# Patient Record
Sex: MALE | Race: WHITE | NOT HISPANIC OR LATINO | Employment: OTHER | URBAN - NONMETROPOLITAN AREA
[De-identification: names, ages, dates, MRNs, and addresses within clinical notes are randomized per-mention and may not be internally consistent; named-entity substitution may affect disease eponyms.]

---

## 2021-11-23 ENCOUNTER — OFFICE VISIT (OUTPATIENT)
Dept: FAMILY MEDICINE | Facility: CLINIC | Age: 76
End: 2021-11-23
Payer: OTHER GOVERNMENT

## 2021-11-23 VITALS
RESPIRATION RATE: 18 BRPM | SYSTOLIC BLOOD PRESSURE: 152 MMHG | HEIGHT: 68 IN | TEMPERATURE: 98 F | OXYGEN SATURATION: 98 % | DIASTOLIC BLOOD PRESSURE: 78 MMHG | BODY MASS INDEX: 28.79 KG/M2 | HEART RATE: 62 BPM | WEIGHT: 190 LBS

## 2021-11-23 DIAGNOSIS — H60.501 ACUTE OTITIS EXTERNA OF RIGHT EAR, UNSPECIFIED TYPE: Primary | ICD-10-CM

## 2021-11-23 PROCEDURE — 99203 OFFICE O/P NEW LOW 30 MIN: CPT | Mod: 25,,, | Performed by: NURSE PRACTITIONER

## 2021-11-23 PROCEDURE — 99203 PR OFFICE/OUTPT VISIT, NEW, LEVL III, 30-44 MIN: ICD-10-PCS | Mod: 25,,, | Performed by: NURSE PRACTITIONER

## 2021-11-23 PROCEDURE — 96372 PR INJECTION,THERAP/PROPH/DIAG2ST, IM OR SUBCUT: ICD-10-PCS | Mod: ,,, | Performed by: NURSE PRACTITIONER

## 2021-11-23 PROCEDURE — 96372 THER/PROPH/DIAG INJ SC/IM: CPT | Mod: ,,, | Performed by: NURSE PRACTITIONER

## 2021-11-23 RX ORDER — AMLODIPINE BESYLATE 10 MG/1
10 TABLET ORAL DAILY
COMMUNITY

## 2021-11-23 RX ORDER — ASPIRIN 81 MG/1
81 TABLET ORAL DAILY
COMMUNITY

## 2021-11-23 RX ORDER — LISINOPRIL 10 MG/1
20 TABLET ORAL DAILY
COMMUNITY

## 2021-11-23 RX ORDER — CEFTRIAXONE 1 G/1
1 INJECTION, POWDER, FOR SOLUTION INTRAMUSCULAR; INTRAVENOUS
Status: COMPLETED | OUTPATIENT
Start: 2021-11-23 | End: 2021-11-23

## 2021-11-23 RX ORDER — AMOXICILLIN 500 MG/1
500 CAPSULE ORAL 3 TIMES DAILY
Qty: 30 CAPSULE | Refills: 0 | Status: SHIPPED | OUTPATIENT
Start: 2021-11-23 | End: 2023-03-10

## 2021-11-23 RX ADMIN — CEFTRIAXONE 1 G: 1 INJECTION, POWDER, FOR SOLUTION INTRAMUSCULAR; INTRAVENOUS at 10:11

## 2021-12-17 ENCOUNTER — IMMUNIZATION (OUTPATIENT)
Dept: FAMILY MEDICINE | Facility: CLINIC | Age: 76
End: 2021-12-17
Payer: MEDICARE

## 2021-12-17 DIAGNOSIS — Z23 NEED FOR VACCINATION: Primary | ICD-10-CM

## 2021-12-17 PROCEDURE — 0064A COVID-19, MRNA, LNP-S, PF, 100 MCG/0.25 ML DOSE VACCINE (MODERNA BOOSTER): ICD-10-PCS | Mod: ,,, | Performed by: NURSE PRACTITIONER

## 2021-12-17 PROCEDURE — 91306 COVID-19, MRNA, LNP-S, PF, 100 MCG/0.25 ML DOSE VACCINE (MODERNA BOOSTER): ICD-10-PCS | Mod: ,,, | Performed by: NURSE PRACTITIONER

## 2021-12-17 PROCEDURE — 0064A COVID-19, MRNA, LNP-S, PF, 100 MCG/0.25 ML DOSE VACCINE (MODERNA BOOSTER): CPT | Mod: ,,, | Performed by: NURSE PRACTITIONER

## 2021-12-17 PROCEDURE — 91306 COVID-19, MRNA, LNP-S, PF, 100 MCG/0.25 ML DOSE VACCINE (MODERNA BOOSTER): CPT | Mod: ,,, | Performed by: NURSE PRACTITIONER

## 2023-03-10 ENCOUNTER — APPOINTMENT (OUTPATIENT)
Dept: RADIOLOGY | Facility: CLINIC | Age: 78
End: 2023-03-10
Attending: NURSE PRACTITIONER
Payer: MEDICARE

## 2023-03-10 ENCOUNTER — OFFICE VISIT (OUTPATIENT)
Dept: FAMILY MEDICINE | Facility: CLINIC | Age: 78
End: 2023-03-10
Payer: MEDICARE

## 2023-03-10 VITALS
BODY MASS INDEX: 27.49 KG/M2 | TEMPERATURE: 98 F | HEART RATE: 65 BPM | SYSTOLIC BLOOD PRESSURE: 160 MMHG | OXYGEN SATURATION: 98 % | WEIGHT: 181.38 LBS | HEIGHT: 68 IN | DIASTOLIC BLOOD PRESSURE: 68 MMHG | RESPIRATION RATE: 18 BRPM

## 2023-03-10 DIAGNOSIS — S60.415A: ICD-10-CM

## 2023-03-10 DIAGNOSIS — L08.9: Primary | ICD-10-CM

## 2023-03-10 DIAGNOSIS — S60.414A: Primary | ICD-10-CM

## 2023-03-10 DIAGNOSIS — L08.9: ICD-10-CM

## 2023-03-10 PROCEDURE — 87077 CULTURE AEROBIC IDENTIFY: CPT | Mod: ,,, | Performed by: CLINICAL MEDICAL LABORATORY

## 2023-03-10 PROCEDURE — 73140 XR FINGER 2 OR MORE VIEWS LEFT: ICD-10-PCS | Mod: 26,LT,, | Performed by: RADIOLOGY

## 2023-03-10 PROCEDURE — 73140 X-RAY EXAM OF FINGER(S): CPT | Mod: TC,RHCUB,LT | Performed by: NURSE PRACTITIONER

## 2023-03-10 PROCEDURE — 99213 PR OFFICE/OUTPT VISIT, EST, LEVL III, 20-29 MIN: ICD-10-PCS | Mod: ,,, | Performed by: NURSE PRACTITIONER

## 2023-03-10 PROCEDURE — 99213 OFFICE O/P EST LOW 20 MIN: CPT | Mod: ,,, | Performed by: NURSE PRACTITIONER

## 2023-03-10 PROCEDURE — 87070 CULTURE, WOUND: ICD-10-PCS | Mod: ,,, | Performed by: CLINICAL MEDICAL LABORATORY

## 2023-03-10 PROCEDURE — 87186 CULTURE, WOUND: ICD-10-PCS | Mod: ,,, | Performed by: CLINICAL MEDICAL LABORATORY

## 2023-03-10 PROCEDURE — 87186 SC STD MICRODIL/AGAR DIL: CPT | Mod: ,,, | Performed by: CLINICAL MEDICAL LABORATORY

## 2023-03-10 PROCEDURE — 87077 CULTURE, WOUND: ICD-10-PCS | Mod: ,,, | Performed by: CLINICAL MEDICAL LABORATORY

## 2023-03-10 PROCEDURE — 73140 X-RAY EXAM OF FINGER(S): CPT | Mod: 26,LT,, | Performed by: RADIOLOGY

## 2023-03-10 PROCEDURE — 87070 CULTURE OTHR SPECIMN AEROBIC: CPT | Mod: ,,, | Performed by: CLINICAL MEDICAL LABORATORY

## 2023-03-10 RX ORDER — ALLOPURINOL 100 MG/1
100 TABLET ORAL DAILY
COMMUNITY
Start: 2023-02-15

## 2023-03-10 RX ORDER — AMOXICILLIN AND CLAVULANATE POTASSIUM 875; 125 MG/1; MG/1
1 TABLET, FILM COATED ORAL EVERY 12 HOURS
Qty: 20 TABLET | Refills: 0 | Status: SHIPPED | OUTPATIENT
Start: 2023-03-10 | End: 2023-03-13

## 2023-03-10 RX ORDER — SILVER SULFADIAZINE 10 G/1000G
CREAM TOPICAL 2 TIMES DAILY
Qty: 85 G | Refills: 0 | Status: SHIPPED | OUTPATIENT
Start: 2023-03-10 | End: 2023-03-20

## 2023-03-10 NOTE — PROGRESS NOTES
ANNA Brurell   [facility]  [address]  [place] [address]     PATIENT NAME: Maicol Rod  : 1945  DATE: 3/10/23  MRN: [id number]      Billing Provider: ANNA Burrell  Level of Service:   Patient PCP Information       Provider PCP Type    ANNA Burrell General            Reason for Visit / Chief Complaint: Insect Bite (Patient believes he got bit by a spider or possible burn to ring finger on right hand, thinks it happened Monday but it has gotten really red and swollen)       Update PCP  Update Chief Complaint         History of Present Illness / Problem Focused Workflow     Maicol Rod presents to the clinic Monday thinks something may have stung him he also thinks he may have burned it while burning trashto his right hand ring finger, having a blister increased swelling redness and draining. He has soaked in epsom salt cleaned with betadine. He has not had any fever continued to do his normal activities.     No results found for: HGBA1C     CMP  No results found for: NA, K, CL, CO2, GLU, BUN, CREATININE, CALCIUM, PROT, ALBUMIN, BILITOT, ALKPHOS, AST, ALT, ANIONGAP, EGFRNORACEVR     No results found for: WBC, RBC, HGB, HCT, MCV, MCH, MCHC, RDW, PLT, MPV, GRAN, LYMPH, MONO, EOS, BASO, EOSINOPHIL, BASOPHIL     No results found for: CHOL  No results found for: HDL  No results found for: LDLCALC  No results found for: TRIG  No results found for: CHOLHDL     Wt Readings from Last 3 Encounters:   03/10/23 0915 82.3 kg (181 lb 6.4 oz)   21 0945 86.2 kg (190 lb)        BP Readings from Last 3 Encounters:   03/10/23 (!) 160/68   21 (!) 152/78        Review of Systems     Review of Systems   Constitutional:  Negative for chills and fever.   HENT:  Negative for trouble swallowing.    Respiratory:  Negative for cough and shortness of breath.    Cardiovascular:  Negative for chest pain and palpitations.   Gastrointestinal:  Negative for  "diarrhea and vomiting.   Musculoskeletal:  Positive for joint swelling.   Integumentary:  Positive for wound.   Neurological:  Negative for weakness, numbness and headaches.      Medical / Social / Family History     Past Medical History:   Diagnosis Date    Hypertension        Past Surgical History:   Procedure Laterality Date    APPENDECTOMY      CHOLECYSTECTOMY         Social History    reports that he has never smoked. He has never been exposed to tobacco smoke. He has never used smokeless tobacco. He reports current alcohol use. He reports that he does not use drugs.    Family History  's family history is not on file.    Medications and Allergies     Medications  Outpatient Medications Marked as Taking for the 3/10/23 encounter (Office Visit) with ANNA Burrell   Medication Sig Dispense Refill    allopurinoL (ZYLOPRIM) 100 MG tablet Take 100 mg by mouth once daily.      amLODIPine (NORVASC) 10 MG tablet Take 10 mg by mouth once daily.      aspirin (ECOTRIN) 81 MG EC tablet Take 81 mg by mouth once daily.      lisinopriL 10 MG tablet Take 10 mg by mouth once daily.         Allergies  Review of patient's allergies indicates:  No Known Allergies    Physical Examination     Vitals:    03/10/23 0915 03/10/23 0941 03/10/23 1022   BP: (!) 180/70 (!) 182/68 (!) 160/68   BP Location: Left arm Right arm Left arm   Patient Position: Sitting Sitting Sitting   Pulse: 65     Resp: 18     Temp: 98.2 °F (36.8 °C)     TempSrc: Oral     SpO2: 98%     Weight: 82.3 kg (181 lb 6.4 oz)     Height: 5' 8" (1.727 m)        Physical Exam  Vitals and nursing note reviewed.   Constitutional:       General: He is not in acute distress.     Appearance: Normal appearance. He is not ill-appearing, toxic-appearing or diaphoretic.   Cardiovascular:      Rate and Rhythm: Normal rate and regular rhythm.   Pulmonary:      Effort: Pulmonary effort is normal.      Breath sounds: Normal breath sounds.   Skin:     Findings: " Erythema, rash and wound present. Rash is pustular.      Comments: Right hand ring finger with increased swelling redness mid finger with pustular lesion, culture obtained   No red streaking to hand isolated to mid finger   Neurological:      Mental Status: He is alert and oriented to person, place, and time.    X-Ray Finger 2 or More Views Left  Narrative: EXAMINATION:  XR FINGER 2 OR MORE VIEWS LEFT    CLINICAL HISTORY:  Abrasion of left ring finger, initial encounter    TECHNIQUE:  Right fingers, AP lateral and oblique views:    COMPARISON:  None.    FINDINGS:  Marked soft tissue swelling is seen involving the midportion of the ring finger.  No radiopaque foreign bodies are seen in the soft tissues.  There is osteoarthritis involving the DIP and PIP joints of all of the visualized fingers.  No osteolytic lesions are seen.  There is a 3 mm metallic foreign body, seen on one view only, related to the proximal phalanx of the index finger.  Impression: Soft tissue swelling is present with osteoarthritis but no radiographic signs of osteomyelitis are seen at this time.    [facility] of [facility]    Electronically signed by: Joanna Jimenez  Date:    03/10/2023  Time:    10:28       Assessment and Plan (including Health Maintenance)      Problem List  Smart Sets  Document Outside HM   :    Plan:     Patient Instructions   - work on diet, specifically cutting back bread, breaded things, cereal, pasta, potatoes, rice  - try to exercise at least 150min a week divided however you want  - if you can do weight, even very light weight do them  - try not to use to much salt, if any, remember that things that are preserved or frozen often contain large amounts of salt as a preserve   - follow up with PCP for uncontrolled hypertension  - follow up next week for recheck wound  - clean with soap and water pat dry apply silvadene cream twice daily and as needed  - start augmentin twice daily with food     Health  Maintenance Due   Topic Date Due    Hepatitis C Screening  Never done    Lipid Panel  Never done    TETANUS VACCINE  Never done    Shingles Vaccine (2 of 3) 10/02/2017    COVID-19 Vaccine (4 - Booster for Moderna series) 02/11/2022    Influenza Vaccine (1) 09/01/2022       Problem List Items Addressed This Visit    None  Visit Diagnoses       Infected abrasion of skin of right ring finger    -  Primary    Relevant Medications    amoxicillin-clavulanate 875-125mg (AUGMENTIN) 875-125 mg per tablet    silver sulfADIAZINE 1% (SILVADENE) 1 % cream    Other Relevant Orders    X-Ray Finger 2 or More Views Left (Completed)    Culture, Wound          Infected abrasion of skin of right ring finger  -     X-Ray Finger 2 or More Views Left; Future; Expected date: 03/10/2023  -     Culture, Wound  -     amoxicillin-clavulanate 875-125mg (AUGMENTIN) 875-125 mg per tablet; Take 1 tablet by mouth every 12 (twelve) hours. for 10 days  Dispense: 20 tablet; Refill: 0  -     silver sulfADIAZINE 1% (SILVADENE) 1 % cream; Apply topically 2 (two) times daily. for 10 days  Dispense: 85 g; Refill: 0       The patient has no Health Maintenance topics of status Not Due    Procedures     Future Appointments   Date Time Provider Department Center   3/16/2023 12:45 PM ANNA Burrell [facility]        No follow-ups on file.     Signature:  ANNA Burrell    Date of encounter: 3/10/233/10/2023

## 2023-03-10 NOTE — PATIENT INSTRUCTIONS
- work on diet, specifically cutting back bread, breaded things, cereal, pasta, potatoes, rice  - try to exercise at least 150min a week divided however you want  - if you can do weight, even very light weight do them  - try not to use to much salt, if any, remember that things that are preserved or frozen often contain large amounts of salt as a preserve   - follow up with PCP for uncontrolled hypertension  - follow up next week for recheck wound  - clean with soap and water pat dry apply silvadene cream twice daily and as needed  - start augmentin twice daily with food

## 2023-03-12 LAB — MICROORGANISM SPEC CULT: ABNORMAL

## 2023-03-13 DIAGNOSIS — S60.414A: Primary | ICD-10-CM

## 2023-03-13 DIAGNOSIS — L08.9: Primary | ICD-10-CM

## 2023-03-13 RX ORDER — CLINDAMYCIN HYDROCHLORIDE 300 MG/1
300 CAPSULE ORAL 3 TIMES DAILY
Qty: 30 CAPSULE | Refills: 0 | Status: SHIPPED | OUTPATIENT
Start: 2023-03-13 | End: 2024-03-02 | Stop reason: ALTCHOICE

## 2023-03-15 ENCOUNTER — OFFICE VISIT (OUTPATIENT)
Dept: FAMILY MEDICINE | Facility: CLINIC | Age: 78
End: 2023-03-15
Payer: MEDICARE

## 2023-03-15 VITALS
WEIGHT: 180 LBS | BODY MASS INDEX: 27.28 KG/M2 | TEMPERATURE: 98 F | SYSTOLIC BLOOD PRESSURE: 190 MMHG | HEIGHT: 68 IN | DIASTOLIC BLOOD PRESSURE: 76 MMHG | HEART RATE: 62 BPM | OXYGEN SATURATION: 98 % | RESPIRATION RATE: 18 BRPM

## 2023-03-15 DIAGNOSIS — L08.9: Primary | ICD-10-CM

## 2023-03-15 DIAGNOSIS — S60.414A: Primary | ICD-10-CM

## 2023-03-15 PROCEDURE — 99212 PR OFFICE/OUTPT VISIT, EST, LEVL II, 10-19 MIN: ICD-10-PCS | Mod: ,,, | Performed by: NURSE PRACTITIONER

## 2023-03-15 PROCEDURE — 99212 OFFICE O/P EST SF 10 MIN: CPT | Mod: ,,, | Performed by: NURSE PRACTITIONER

## 2023-03-15 NOTE — PROGRESS NOTES
ANNA Burrell   [facility]  [address]  [place] [address]     PATIENT NAME: Maicol Rod  : 1945  DATE: 3/15/23  MRN: [id number]      Billing Provider: ANNA Burrell  Level of Service:   Patient PCP Information       Provider PCP Type    ANNA Burrell General            Reason for Visit / Chief Complaint: Wound Infection (Follow Up on wound infection. Hurts at night but is not any medication for pain. Changed wrap 2-3X a day. States layer of skin is coming off. )       Update PCP  Update Chief Complaint         History of Present Illness / Problem Focused Workflow     Maicol Rod presents to the clinic follow up wound to finger has been using silvadene and started clindamycin stopped augmentin reports improving less red but skin loose    No results found for: HGBA1C     CMP  No results found for: NA, K, CL, CO2, GLU, BUN, CREATININE, CALCIUM, PROT, ALBUMIN, BILITOT, ALKPHOS, AST, ALT, ANIONGAP, EGFRNORACEVR     No results found for: WBC, RBC, HGB, HCT, MCV, MCH, MCHC, RDW, PLT, MPV, GRAN, LYMPH, MONO, EOS, BASO, EOSINOPHIL, BASOPHIL     No results found for: CHOL  No results found for: HDL  No results found for: LDLCALC  No results found for: TRIG  No results found for: CHOLHDL     Wt Readings from Last 3 Encounters:   03/15/23 1406 81.6 kg (180 lb)   03/10/23 0915 82.3 kg (181 lb 6.4 oz)   21 0945 86.2 kg (190 lb)        BP Readings from Last 3 Encounters:   03/15/23 (!) 190/76   03/10/23 (!) 160/68   21 (!) 152/78        Review of Systems     Review of Systems   Constitutional:  Negative for chills and fever.   HENT:  Negative for trouble swallowing.    Respiratory:  Negative for cough and shortness of breath.    Cardiovascular:  Negative for chest pain and palpitations.   Gastrointestinal:  Negative for diarrhea and vomiting.   Musculoskeletal:  Negative for joint swelling.   Integumentary:  Positive for wound.   Neurological:   "Negative for weakness, numbness and headaches.      Medical / Social / Family History     Past Medical History:   Diagnosis Date    Hypertension        Past Surgical History:   Procedure Laterality Date    APPENDECTOMY      CHOLECYSTECTOMY         Social History    reports that he has never smoked. He has never been exposed to tobacco smoke. He has never used smokeless tobacco. He reports current alcohol use. He reports that he does not use drugs.    Family History  's family history is not on file.    Medications and Allergies     Medications  Outpatient Medications Marked as Taking for the 3/15/23 encounter (Office Visit) with ANNA Burrell   Medication Sig Dispense Refill    allopurinoL (ZYLOPRIM) 100 MG tablet Take 100 mg by mouth once daily.      amLODIPine (NORVASC) 10 MG tablet Take 10 mg by mouth once daily.      aspirin (ECOTRIN) 81 MG EC tablet Take 81 mg by mouth once daily.      clindamycin (CLEOCIN) 300 MG capsule Take 1 capsule (300 mg total) by mouth 3 (three) times daily. 30 capsule 0    lisinopriL 10 MG tablet Take 10 mg by mouth once daily.      silver sulfADIAZINE 1% (SILVADENE) 1 % cream Apply topically 2 (two) times daily. for 10 days 85 g 0       Allergies  Review of patient's allergies indicates:  No Known Allergies    Physical Examination     Vitals:    03/15/23 1406   BP: (!) 190/76   BP Location: Left arm   Patient Position: Sitting   Pulse: 62   Resp: 18   Temp: 97.9 °F (36.6 °C)   TempSrc: Oral   SpO2: 98%   Weight: 81.6 kg (180 lb)   Height: 5' 8" (1.727 m)      Physical Exam  Vitals and nursing note reviewed.   Constitutional:       General: He is not in acute distress.     Appearance: Normal appearance. He is not ill-appearing, toxic-appearing or diaphoretic.   Cardiovascular:      Rate and Rhythm: Normal rate and regular rhythm.   Pulmonary:      Effort: Pulmonary effort is normal.      Breath sounds: Normal breath sounds.   Skin:     Findings: Lesion and wound " present.      Comments: Right hand ring finger with mimimal redness pustule draining yellow green discharge blister noted pink smooth skin noted beneath, mild swelling redness almost resolved   Neurological:      Mental Status: He is alert and oriented to person, place, and time.        Assessment and Plan (including Health Maintenance)      Problem List  Smart Sets  Document Outside HM   :    Plan:     Patient Instructions   Continue silvadene twice daily and as needed if soiled, continue clindamycin, protect finger with activity use clean gloves, follow up next week for recheck wound     Health Maintenance Due   Topic Date Due    Hepatitis C Screening  Never done    Lipid Panel  Never done    TETANUS VACCINE  Never done    Shingles Vaccine (2 of 3) 10/02/2017    COVID-19 Vaccine (4 - Booster for Moderna series) 02/11/2022    Influenza Vaccine (1) 09/01/2022       Problem List Items Addressed This Visit    None  Visit Diagnoses       Infected abrasion of skin of right ring finger    -  Primary          Infected abrasion of skin of right ring finger       The patient has no Health Maintenance topics of status Not Due    Procedures     Future Appointments   Date Time Provider Department Center   3/24/2023 11:15 AM ANNA Burrell [facility]        No follow-ups on file.     Signature:  ANNA Burrell    Date of encounter: 3/15/233/15/2023

## 2023-03-15 NOTE — PATIENT INSTRUCTIONS
Continue silvadene twice daily and as needed if soiled, continue clindamycin, protect finger with activity use clean gloves, follow up next week for recheck wound

## 2023-03-24 ENCOUNTER — OFFICE VISIT (OUTPATIENT)
Dept: FAMILY MEDICINE | Facility: CLINIC | Age: 78
End: 2023-03-24
Payer: MEDICARE

## 2023-03-24 VITALS
RESPIRATION RATE: 18 BRPM | BODY MASS INDEX: 27.03 KG/M2 | HEIGHT: 68 IN | HEART RATE: 65 BPM | DIASTOLIC BLOOD PRESSURE: 78 MMHG | WEIGHT: 178.38 LBS | SYSTOLIC BLOOD PRESSURE: 140 MMHG | TEMPERATURE: 98 F | OXYGEN SATURATION: 98 %

## 2023-03-24 DIAGNOSIS — S60.414A: Primary | ICD-10-CM

## 2023-03-24 DIAGNOSIS — L08.9: Primary | ICD-10-CM

## 2023-03-24 PROCEDURE — 99212 OFFICE O/P EST SF 10 MIN: CPT | Mod: ,,, | Performed by: NURSE PRACTITIONER

## 2023-03-24 PROCEDURE — 99212 PR OFFICE/OUTPT VISIT, EST, LEVL II, 10-19 MIN: ICD-10-PCS | Mod: ,,, | Performed by: NURSE PRACTITIONER

## 2023-03-24 NOTE — PROGRESS NOTES
ANNA Burrell   [facility]  [address]  [place] [address]     PATIENT NAME: Maicol Rod  : 1945  DATE: 3/24/23  MRN: [id number]      Billing Provider: ANNA Burrell  Level of Service:   Patient PCP Information       Provider PCP Type    ANNA Burrell General            Reason for Visit / Chief Complaint: Abrasion (Follow up on abrasion on right ring finger.)       Update PCP  Update Chief Complaint         History of Present Illness / Problem Focused Workflow     Maicol Rod presents to the clinic follow up finger infection improving no draining, skin growing back    No results found for: HGBA1C     CMP  No results found for: NA, K, CL, CO2, GLU, BUN, CREATININE, CALCIUM, PROT, ALBUMIN, BILITOT, ALKPHOS, AST, ALT, ANIONGAP, EGFRNORACEVR     No results found for: WBC, RBC, HGB, HCT, MCV, MCH, MCHC, RDW, PLT, MPV, GRAN, LYMPH, MONO, EOS, BASO, EOSINOPHIL, BASOPHIL     No results found for: CHOL  No results found for: HDL  No results found for: LDLCALC  No results found for: TRIG  No results found for: CHOLHDL     Wt Readings from Last 3 Encounters:   23 1100 80.9 kg (178 lb 6.4 oz)   03/15/23 1406 81.6 kg (180 lb)   03/10/23 0915 82.3 kg (181 lb 6.4 oz)        BP Readings from Last 3 Encounters:   23 (!) 140/78   03/15/23 (!) 190/76   03/10/23 (!) 160/68        Review of Systems     Review of Systems   Constitutional:  Negative for chills and fever.   HENT:  Negative for trouble swallowing.    Respiratory:  Negative for cough and shortness of breath.    Cardiovascular:  Negative for chest pain and palpitations.   Gastrointestinal:  Negative for diarrhea and vomiting.   Musculoskeletal:  Negative for joint swelling.   Integumentary:  Positive for wound.   Neurological:  Negative for weakness, numbness and headaches.      Medical / Social / Family History     Past Medical History:   Diagnosis Date    Hypertension        Past Surgical  "History:   Procedure Laterality Date    APPENDECTOMY      CHOLECYSTECTOMY         Social History    reports that he has never smoked. He has never been exposed to tobacco smoke. He has never used smokeless tobacco. He reports current alcohol use. He reports that he does not use drugs.    Family History  's family history is not on file.    Medications and Allergies     Medications  Outpatient Medications Marked as Taking for the 3/24/23 encounter (Office Visit) with ANNA Burrell   Medication Sig Dispense Refill    allopurinoL (ZYLOPRIM) 100 MG tablet Take 100 mg by mouth once daily.      amLODIPine (NORVASC) 10 MG tablet Take 10 mg by mouth once daily.      aspirin (ECOTRIN) 81 MG EC tablet Take 81 mg by mouth once daily.      clindamycin (CLEOCIN) 300 MG capsule Take 1 capsule (300 mg total) by mouth 3 (three) times daily. 30 capsule 0    lisinopriL 10 MG tablet Take 10 mg by mouth once daily.         Allergies  Review of patient's allergies indicates:  No Known Allergies    Physical Examination     Vitals:    03/24/23 1100   BP: (!) 140/78   BP Location: Left arm   Patient Position: Sitting   Pulse: 65   Resp: 18   Temp: 98 °F (36.7 °C)   TempSrc: Oral   SpO2: 98%   Weight: 80.9 kg (178 lb 6.4 oz)   Height: 5' 8" (1.727 m)      Physical Exam  Vitals and nursing note reviewed.   Constitutional:       General: He is not in acute distress.     Appearance: Normal appearance. He is not ill-appearing, toxic-appearing or diaphoretic.   Cardiovascular:      Rate and Rhythm: Normal rate and regular rhythm.   Pulmonary:      Effort: Pulmonary effort is normal.      Breath sounds: Normal breath sounds.   Skin:     Findings: Lesion and wound present.      Comments: Right hand ring finger with mimimal redness  pink smooth skin noted beneath, mild swelling redness almost resolved no open area no exudate noted.    Neurological:      Mental Status: He is alert and oriented to person, place, and time.    "     Assessment and Plan (including Health Maintenance)      Problem List  Smart Sets  Document Outside HM   :    Plan:     Patient Instructions   Finish antibiotic and protect finger if not improving return to the clinic.      Health Maintenance Due   Topic Date Due    Hepatitis C Screening  Never done    Lipid Panel  Never done    TETANUS VACCINE  Never done    Shingles Vaccine (2 of 3) 10/02/2017    COVID-19 Vaccine (4 - Booster for Moderna series) 02/11/2022    Influenza Vaccine (1) 09/01/2022       Problem List Items Addressed This Visit    None  Visit Diagnoses       Infected abrasion of skin of right ring finger    -  Primary          Infected abrasion of skin of right ring finger       The patient has no Health Maintenance topics of status Not Due    Procedures     No future appointments.     No follow-ups on file.     Signature:  ANNA Burrell    Date of encounter: 3/24/233/24/2023

## 2024-03-02 ENCOUNTER — HOSPITAL ENCOUNTER (EMERGENCY)
Facility: HOSPITAL | Age: 79
Discharge: ANOTHER HOSPITAL | End: 2024-03-02
Payer: OTHER GOVERNMENT

## 2024-03-02 VITALS
SYSTOLIC BLOOD PRESSURE: 189 MMHG | HEIGHT: 68 IN | TEMPERATURE: 98 F | BODY MASS INDEX: 27.43 KG/M2 | DIASTOLIC BLOOD PRESSURE: 89 MMHG | HEART RATE: 67 BPM | OXYGEN SATURATION: 95 % | WEIGHT: 181 LBS | RESPIRATION RATE: 20 BRPM

## 2024-03-02 DIAGNOSIS — S05.31XA: Primary | ICD-10-CM

## 2024-03-02 LAB
ANION GAP SERPL CALCULATED.3IONS-SCNC: 14 MMOL/L (ref 7–16)
BASOPHILS # BLD AUTO: 0.03 K/UL (ref 0–0.2)
BASOPHILS NFR BLD AUTO: 0.3 % (ref 0–1)
BUN SERPL-MCNC: 23 MG/DL (ref 7–18)
BUN/CREAT SERPL: 18 (ref 6–20)
CALCIUM SERPL-MCNC: 10.1 MG/DL (ref 8.5–10.1)
CHLORIDE SERPL-SCNC: 102 MMOL/L (ref 98–107)
CO2 SERPL-SCNC: 26 MMOL/L (ref 21–32)
CREAT SERPL-MCNC: 1.27 MG/DL (ref 0.7–1.3)
DIFFERENTIAL METHOD BLD: NORMAL
EGFR (NO RACE VARIABLE) (RUSH/TITUS): 58 ML/MIN/1.73M2
EOSINOPHIL # BLD AUTO: 0.34 K/UL (ref 0–0.5)
EOSINOPHIL NFR BLD AUTO: 3.7 % (ref 1–4)
ERYTHROCYTE [DISTWIDTH] IN BLOOD BY AUTOMATED COUNT: 13.9 % (ref 11.5–14.5)
GLUCOSE SERPL-MCNC: 134 MG/DL (ref 74–106)
HCT VFR BLD AUTO: 47 % (ref 40–54)
HGB BLD-MCNC: 15.3 G/DL (ref 13.5–18)
LYMPHOCYTES # BLD AUTO: 2.72 K/UL (ref 1–4.8)
LYMPHOCYTES NFR BLD AUTO: 29.3 % (ref 27–41)
MCH RBC QN AUTO: 28.1 PG (ref 27–31)
MCHC RBC AUTO-ENTMCNC: 32.6 G/DL (ref 32–36)
MCV RBC AUTO: 86.4 FL (ref 80–96)
MONOCYTES # BLD AUTO: 0.48 K/UL (ref 0–0.8)
MONOCYTES NFR BLD AUTO: 5.2 % (ref 2–6)
MPC BLD CALC-MCNC: 11.1 FL (ref 9.4–12.4)
NEUTROPHILS # BLD AUTO: 5.72 K/UL (ref 1.8–7.7)
NEUTROPHILS NFR BLD AUTO: 61.5 % (ref 53–65)
PLATELET # BLD AUTO: 233 K/UL (ref 150–400)
POTASSIUM SERPL-SCNC: 3.6 MMOL/L (ref 3.5–5.1)
RBC # BLD AUTO: 5.44 M/UL (ref 4.6–6.2)
SODIUM SERPL-SCNC: 138 MMOL/L (ref 136–145)
WBC # BLD AUTO: 9.29 K/UL (ref 4.5–11)

## 2024-03-02 PROCEDURE — 85025 COMPLETE CBC W/AUTO DIFF WBC: CPT | Performed by: NURSE PRACTITIONER

## 2024-03-02 PROCEDURE — 63600175 PHARM REV CODE 636 W HCPCS: Performed by: NURSE PRACTITIONER

## 2024-03-02 PROCEDURE — 80048 BASIC METABOLIC PNL TOTAL CA: CPT | Performed by: NURSE PRACTITIONER

## 2024-03-02 PROCEDURE — 25000003 PHARM REV CODE 250: Performed by: NURSE PRACTITIONER

## 2024-03-02 PROCEDURE — 96374 THER/PROPH/DIAG INJ IV PUSH: CPT

## 2024-03-02 PROCEDURE — 99285 EMERGENCY DEPT VISIT HI MDM: CPT | Mod: ,,, | Performed by: NURSE PRACTITIONER

## 2024-03-02 PROCEDURE — 99285 EMERGENCY DEPT VISIT HI MDM: CPT | Mod: 25

## 2024-03-02 RX ORDER — CEFTRIAXONE 1 G/1
1 INJECTION, POWDER, FOR SOLUTION INTRAMUSCULAR; INTRAVENOUS
Status: DISCONTINUED | OUTPATIENT
Start: 2024-03-02 | End: 2024-03-02

## 2024-03-02 RX ORDER — TETRACAINE HYDROCHLORIDE 5 MG/ML
2 SOLUTION OPHTHALMIC
Status: COMPLETED | OUTPATIENT
Start: 2024-03-02 | End: 2024-03-02

## 2024-03-02 RX ORDER — IBUPROFEN 200 MG
200 TABLET ORAL EVERY 6 HOURS PRN
COMMUNITY

## 2024-03-02 RX ORDER — DEXTROSE MONOHYDRATE 5 G/100ML
INJECTION INTRAVENOUS
Status: DISCONTINUED
Start: 2024-03-02 | End: 2024-03-02 | Stop reason: HOSPADM

## 2024-03-02 RX ORDER — CEFTRIAXONE 1 G/1
1 INJECTION, POWDER, FOR SOLUTION INTRAMUSCULAR; INTRAVENOUS
Status: COMPLETED | OUTPATIENT
Start: 2024-03-02 | End: 2024-03-02

## 2024-03-02 RX ADMIN — TETRACAINE HYDROCHLORIDE 2 DROP: 5 SOLUTION OPHTHALMIC at 04:03

## 2024-03-02 RX ADMIN — CEFTRIAXONE 1 G: 1 INJECTION, POWDER, FOR SOLUTION INTRAMUSCULAR; INTRAVENOUS at 05:03

## 2024-03-02 NOTE — ED NOTES
[facility] referral faxed and ALFONZO Galaviz NP on the phone with [facility]

## 2024-03-02 NOTE — ED PROVIDER NOTES
Encounter Date: 3/2/2024       History     Chief Complaint   Patient presents with    Eye Injury     Hit with a stick in right eye approx. one hour PTA. States he washed eye out PTA but is concerned because eye bleed PTA, and vision is blurry in right eye     Patient is a 78-year-old male who presents to the ED today with a eye injury right.  He states he was moving some plywood when they stick struck him in the eye.  He states his eye immediately began bleeding.  He proceeded to wash his eye out with water and a mild soap solution.  He reports there is decreased vision in this right eye.  He reports pain in the right eye that he describes as a sandpaper feeling.        Review of patient's allergies indicates:  No Known Allergies  Past Medical History:   Diagnosis Date    Hypertension      Past Surgical History:   Procedure Laterality Date    APPENDECTOMY      CHOLECYSTECTOMY       History reviewed. No pertinent family history.  Social History     Tobacco Use    Smoking status: Never     Passive exposure: Never    Smokeless tobacco: Never   Substance Use Topics    Alcohol use: Yes     Comment: occ beer or wine    Drug use: Never     Review of Systems   Constitutional:  Negative for fever.   HENT:  Negative for sore throat.    Eyes:  Positive for pain, discharge and visual disturbance.        Bleeding of eye   Respiratory:  Negative for shortness of breath.    Cardiovascular:  Negative for chest pain.   Gastrointestinal:  Negative for nausea.   Genitourinary:  Negative for dysuria.   Musculoskeletal:  Negative for back pain.   Skin:  Negative for rash.   Neurological:  Negative for dizziness, weakness, light-headedness and headaches.   Hematological:  Does not bruise/bleed easily.       Physical Exam     Initial Vitals [03/02/24 1550]   BP Pulse Resp Temp SpO2   (!) 189/86 66 18 97.7 °F (36.5 °C) 97 %      MAP       --         Physical Exam    Constitutional: He appears well-developed and well-nourished.   HENT:    Nose: Nose normal.   Mouth/Throat: Oropharynx is clear and moist.   Eyes:   On close inspection there appears to be a 4 mm laceration to the inner aspect of the right eye no obvious foreign body present.  Evaluation was stopped after the laceration was discovered   Neck: Neck supple.   Cardiovascular:  Normal rate, regular rhythm and normal heart sounds.           Pulmonary/Chest: Breath sounds normal.   Abdominal: Abdomen is soft. Bowel sounds are normal.   Musculoskeletal:         General: Normal range of motion.      Cervical back: Neck supple.     Neurological: He is oriented to person, place, and time.   Skin: Skin is warm and dry.         Medical Screening Exam   See Full Note    ED Course   Procedures  Labs Reviewed   CBC W/ AUTO DIFFERENTIAL    Narrative:     The following orders were created for panel order CBC auto differential.  Procedure                               Abnormality         Status                     ---------                               -----------         ------                     CBC with Differential[[id number]]                           Final result                 Please view results for these tests on the individual orders.   CBC WITH DIFFERENTIAL   BASIC METABOLIC PANEL          Imaging Results              CT Orbits Sella Post Fossa Without Cont (Final result)  Result time 03/02/24 16:54:18      Final result by Gordon Simpson MD (03/02/24 16:54:18)                   Impression:      No radiopaque foreign bodies of the orbits.      Electronically signed by: Gordon Simpson  Date:    03/02/2024  Time:    16:54               Narrative:    EXAMINATION:  CT ORBITS SELLA POST FOSSA WITHOUT CONT    CLINICAL HISTORY:  Foreign body, eye;scleral laceration;    TECHNIQUE:  CT of the orbits performed without intravenous contrast    COMPARISON:  None    FINDINGS:  There is no fracture around the orbits.  No radiopaque foreign bodies.  The globes appear intact by CT.  Previous lens extractions.   Intra and extraconal fat appear normal.  Visualized intracranial contents unremarkable.  Vascular calcifications seen.  Mastoid air cells and middle ear cavities are clear.                                       Medications   dextrose 5 % in water (D5W) IVPB (has no administration in time range)   TETRAcaine HCl (PF) 0.5 % Drop 2 drop (2 drops Right Eye Given 3/2/24 1616)   cefTRIAXone injection 1 g (1 g Intravenous Given 3/2/24 1736)     MDM:  Patient is a 72-year-old male who presented with acute right eye injury.  A partial eye exam was conducted who was found to have a scleral laceration to the inner aspect of the right eye.  A CT scan was performed that did not reveal an open globe.  The CT scan did not reveal a retained foreign body.  At 5:00 p.m. I contacted Dr. Victoria of [facility] who agreed patient needed ophthalmology eval, and will need transferred to [facility] today.  I guard was applied.  Patient was covered with Rocephin IV baseline labs were drawn.  Ambulance was contacted for transfer.                                  Clinical Impression:   Final diagnoses:  [S05.31XA] Scleral laceration of right eye, initial encounter (Primary)        ED Disposition Condition    Transfer to Another Facility Stable                Kristin Galaviz, ANNA  03/02/24 3050

## 2024-03-02 NOTE — Clinical Note
Patient refuses ambulance transfer wishing to travel by POV I have discussed the implications up to and including loss of eyesight.

## 2024-03-02 NOTE — ED NOTES
Eye shield made out of bottom portion of styrofoam cup placed over right eye and secured with perforated tape, patient tolerated well